# Patient Record
Sex: FEMALE | RURAL
[De-identification: names, ages, dates, MRNs, and addresses within clinical notes are randomized per-mention and may not be internally consistent; named-entity substitution may affect disease eponyms.]

---

## 2020-09-01 ENCOUNTER — APPOINTMENT (RX ONLY)
Dept: RURAL CLINIC 4 | Facility: CLINIC | Age: 80
Setting detail: DERMATOLOGY
End: 2020-09-01

## 2020-09-01 DIAGNOSIS — D22 MELANOCYTIC NEVI: ICD-10-CM

## 2020-09-01 DIAGNOSIS — L82.1 OTHER SEBORRHEIC KERATOSIS: ICD-10-CM

## 2020-09-01 DIAGNOSIS — L57.0 ACTINIC KERATOSIS: ICD-10-CM

## 2020-09-01 PROBLEM — D48.5 NEOPLASM OF UNCERTAIN BEHAVIOR OF SKIN: Status: ACTIVE | Noted: 2020-09-01

## 2020-09-01 PROCEDURE — ? COUNSELING

## 2020-09-01 PROCEDURE — 99202 OFFICE O/P NEW SF 15 MIN: CPT

## 2020-09-01 PROCEDURE — ? TREATMENT REGIMEN

## 2020-09-01 PROCEDURE — ? OBSERVATION

## 2020-09-01 ASSESSMENT — LOCATION ZONE DERM
LOCATION ZONE: TRUNK
LOCATION ZONE: SCALP
LOCATION ZONE: NOSE
LOCATION ZONE: FACE

## 2020-09-01 ASSESSMENT — LOCATION DETAILED DESCRIPTION DERM
LOCATION DETAILED: RIGHT CENTRAL MALAR CHEEK
LOCATION DETAILED: LEFT MID-UPPER BACK
LOCATION DETAILED: RIGHT MID-UPPER BACK
LOCATION DETAILED: NASAL DORSUM
LOCATION DETAILED: LEFT CENTRAL PARIETAL SCALP

## 2020-09-01 ASSESSMENT — LOCATION SIMPLE DESCRIPTION DERM
LOCATION SIMPLE: LEFT UPPER BACK
LOCATION SIMPLE: RIGHT CHEEK
LOCATION SIMPLE: SCALP
LOCATION SIMPLE: NOSE
LOCATION SIMPLE: RIGHT UPPER BACK

## 2020-09-01 NOTE — PROCEDURE: TREATMENT REGIMEN
Detail Level: Simple
Plan: Consider treating with 5% fluorouracil cream from home by applying nightly until irritation then stop and allow to heal.

## 2022-02-22 ENCOUNTER — OFFICE VISIT (OUTPATIENT)
Dept: URBAN - METROPOLITAN AREA CLINIC 46 | Facility: CLINIC | Age: 82
End: 2022-02-22
Payer: MEDICARE

## 2022-02-22 DIAGNOSIS — H35.3131 NONEXUDATIVE MACULAR DEGENERATION, EARLY DRY STAGE, BILATERAL: ICD-10-CM

## 2022-02-22 DIAGNOSIS — H40.1122 PRIMARY OPEN-ANGLE GLAUCOMA, MODERATE STAGE, LEFT EYE: Primary | ICD-10-CM

## 2022-02-22 DIAGNOSIS — H35.3111 NONEXUDATIVE AGE-RELATED MACULAR DEGENERATION, RIGHT EYE, EARLY DRY STAGE: ICD-10-CM

## 2022-02-22 PROCEDURE — 92134 CPTRZ OPH DX IMG PST SGM RTA: CPT | Performed by: OPHTHALMOLOGY

## 2022-02-22 PROCEDURE — 99213 OFFICE O/P EST LOW 20 MIN: CPT | Performed by: OPHTHALMOLOGY

## 2022-02-22 RX ORDER — BRIMONIDINE TARTRATE 2 MG/ML
0.2 % SOLUTION/ DROPS OPHTHALMIC
Refills: 0 | Status: ACTIVE
Start: 2022-02-22

## 2022-02-22 ASSESSMENT — INTRAOCULAR PRESSURE
OS: 25
OS: 22
OD: 22
OD: 18

## 2022-02-22 NOTE — IMPRESSION/PLAN
Impression: Nonexudative macular degeneration, early dry stage, bilateral: H35.4654. Plan: Patient advised that their macular degeneration appears stable. They are advised to continue AREDS/AREDS2 and the Amsler grid. We will continue to monitor periodically; call if any changes noted.

## 2022-02-22 NOTE — IMPRESSION/PLAN
Impression: Primary open-angle glaucoma, moderate stage, left eye: H40.1122. Plan: Intraocular pressure is not at acceptable limits. Discussed options.  They wish to proceed with SLT OS.

- Continue as previously prescribed Brimonidine 0.2% : Instill one drop in both eyes three times daily 

- Continue as previously prescribed Dorzolamide 22.3 mg-timolol 6.8 mg/mL eye drops : Instill one drop in both eyes twice daily 

- Continue as previously prescribed Lumigan 0.01 % eye Drops : Instill one drop in both eyes at bedtime

## 2022-04-20 ENCOUNTER — TESTING ONLY (OUTPATIENT)
Dept: URBAN - METROPOLITAN AREA CLINIC 46 | Facility: CLINIC | Age: 82
End: 2022-04-20
Payer: MEDICARE

## 2022-04-20 DIAGNOSIS — H40.1122 PRIMARY OPEN-ANGLE GLAUCOMA, LEFT EYE, MODERATE STAGE: Primary | ICD-10-CM

## 2022-04-20 PROCEDURE — 92133 CPTRZD OPH DX IMG PST SGM ON: CPT | Performed by: OPHTHALMOLOGY

## 2022-04-20 PROCEDURE — 92083 EXTENDED VISUAL FIELD XM: CPT | Performed by: OPHTHALMOLOGY

## 2022-04-22 ENCOUNTER — OFFICE VISIT (OUTPATIENT)
Dept: URBAN - METROPOLITAN AREA CLINIC 46 | Facility: CLINIC | Age: 82
End: 2022-04-22
Payer: MEDICARE

## 2022-04-22 DIAGNOSIS — H40.1132 PRIMARY OPEN-ANGLE GLAUCOMA, BILATERAL, MODERATE STAGE: Primary | ICD-10-CM

## 2022-04-22 PROCEDURE — 99213 OFFICE O/P EST LOW 20 MIN: CPT | Performed by: OPHTHALMOLOGY

## 2022-04-22 ASSESSMENT — INTRAOCULAR PRESSURE
OS: 24
OS: 20
OD: 16
OD: 20
OD: 15
OS: 22

## 2022-04-22 NOTE — IMPRESSION/PLAN
Impression: Primary open-angle glaucoma, bilateral, moderate stage: B38.8052. Plan: 
- Continue as previously prescribed Brimonidine 0.2% : Instill one drop in both eyes three times daily 

- Continue as previously prescribed Dorzolamide 22.3 mg-timolol 6.8 mg/mL eye drops : Instill one drop in both eyes twice daily 

- Continue as previously prescribed Lumigan 0.01 % eye Drops : Instill one drop in both eyes at bedtime

-Start Rhopressa at bedtime in left eye only, sample was given.

## 2022-05-19 ENCOUNTER — OFFICE VISIT (OUTPATIENT)
Dept: URBAN - METROPOLITAN AREA CLINIC 46 | Facility: CLINIC | Age: 82
End: 2022-05-19
Payer: MEDICARE

## 2022-05-19 DIAGNOSIS — H40.1123 PRIMARY OPEN-ANGLE GLAUCOMA, SEVERE STAGE, LEFT EYE: ICD-10-CM

## 2022-05-19 DIAGNOSIS — H40.1112 PRIMARY OPEN-ANGLE GLAUCOMA, MODERATE STAGE, RIGHT EYE: Primary | ICD-10-CM

## 2022-05-19 PROCEDURE — 99213 OFFICE O/P EST LOW 20 MIN: CPT | Performed by: OPHTHALMOLOGY

## 2022-05-19 ASSESSMENT — INTRAOCULAR PRESSURE
OS: 24
OD: 16
OD: 20
OS: 22

## 2022-05-19 NOTE — IMPRESSION/PLAN
Impression: Primary open-angle glaucoma, moderate stage, right eye: H40.1112. Plan: - Continue as previously prescribed Brimonidine 0.2% : Instill one drop in both eyes three times daily 

- Continue as previously prescribed Dorzolamide 22.3 mg-timolol 6.8 mg/mL eye drops : Instill one drop in both eyes twice daily 

- Continue as previously prescribed Lumigan 0.01 % eye Drops : Instill one drop in both eyes at bedtime

-Change Rhopressa at bedtime into both eyes, sample was given. It appears that there is a 3 mm pressure decrease in the left eye relative to the right with Rhopressa; continue this for now in the left and start it in the right. Clearly she needs a lower pressure. Non-invasive options would include TSCP. I believe she will likely require trabeculectomy or tube shunt.

## 2022-05-19 NOTE — IMPRESSION/PLAN
Impression: Primary open-angle glaucoma, severe stage, left eye: H40.2666. Plan: - Continue as previously prescribed Brimonidine 0.2% : Instill one drop in both eyes three times daily 

- Continue as previously prescribed Dorzolamide 22.3 mg-timolol 6.8 mg/mL eye drops : Instill one drop in both eyes twice daily 

- Continue as previously prescribed Lumigan 0.01 % eye Drops : Instill one drop in both eyes at bedtime

-Change Rhopressa at bedtime into both eyes, sample was given. It appears that there is a 3 mm pressure decrease in the left eye relative to the right with Rhopressa; continue this for now in the left and start it in the right. Clearly she needs a lower pressure. Non-invasive options would include TSCP. I believe she will likely require trabeculectomy or tube shunt.

## 2023-01-05 ENCOUNTER — OFFICE VISIT (OUTPATIENT)
Dept: URBAN - METROPOLITAN AREA CLINIC 46 | Facility: CLINIC | Age: 83
End: 2023-01-05
Payer: MEDICARE

## 2023-01-05 DIAGNOSIS — H35.3131 NONEXUDATIVE MACULAR DEGENERATION, EARLY DRY STAGE, BILATERAL: ICD-10-CM

## 2023-01-05 DIAGNOSIS — H40.1132 PRIMARY OPEN-ANGLE GLAUCOMA, MODERATE STAGE, BILATERAL: Primary | ICD-10-CM

## 2023-01-05 PROCEDURE — 99213 OFFICE O/P EST LOW 20 MIN: CPT | Performed by: OPHTHALMOLOGY

## 2023-01-05 RX ORDER — ACETAZOLAMIDE 500 MG/1
500 MG CAPSULE, EXTENDED RELEASE ORAL
Qty: 60 | Refills: 2 | Status: ACTIVE
Start: 2023-01-05

## 2023-01-05 ASSESSMENT — INTRAOCULAR PRESSURE
OS: 23
OD: 13
OS: 21
OD: 19

## 2023-01-05 NOTE — IMPRESSION/PLAN
Impression: Primary open-angle glaucoma, moderate stage, bilateral: H40.1132. Plan:  Pressure is not at acceptable range. Recommend SLT OS. Start Diamox BID PO. Continue:
 Rocklatan at bedtime in both eyes. Dorzolamide/Timolol three times daily in both eyes. Brimonidine three times daily in both eyes. Clinical examination and testing indicates risk for loss of vision due to glaucoma.

## 2023-01-05 NOTE — IMPRESSION/PLAN
Impression: Nonexudative macular degeneration, early dry stage, bilateral: H35.4516. Plan: Patient advised that their macular degeneration appears stable. They are advised to continue AREDS/AREDS2 and the Amsler grid. We will continue to monitor periodically; call if any changes noted.

## 2023-01-16 ENCOUNTER — LASER (OUTPATIENT)
Dept: URBAN - METROPOLITAN AREA SURGERY 25 | Facility: SURGERY | Age: 83
End: 2023-01-16
Payer: MEDICARE

## 2023-01-16 PROCEDURE — 65855 TRABECULOPLASTY LASER SURG: CPT | Performed by: OPHTHALMOLOGY

## 2023-02-17 ENCOUNTER — OFFICE VISIT (OUTPATIENT)
Dept: URBAN - METROPOLITAN AREA CLINIC 46 | Facility: CLINIC | Age: 83
End: 2023-02-17
Payer: MEDICARE

## 2023-02-17 DIAGNOSIS — H40.1132 PRIMARY OPEN-ANGLE GLAUCOMA, MODERATE STAGE, BILATERAL: Primary | ICD-10-CM

## 2023-02-17 DIAGNOSIS — H35.3131 NONEXUDATIVE MACULAR DEGENERATION, EARLY DRY STAGE, BILATERAL: ICD-10-CM

## 2023-02-17 DIAGNOSIS — H02.412 MECHANICAL PTOSIS OF LEFT EYELID: ICD-10-CM

## 2023-02-17 PROCEDURE — 99213 OFFICE O/P EST LOW 20 MIN: CPT | Performed by: OPHTHALMOLOGY

## 2023-02-17 ASSESSMENT — INTRAOCULAR PRESSURE
OS: 23
OD: 14
OD: 16
OS: 20

## 2023-02-17 NOTE — IMPRESSION/PLAN
Impression: Primary open-angle glaucoma, moderate stage, bilateral: H40.1132. Plan: Pressure is not at acceptable range. Recommend consult with Dr. Andreina Suarez Continue  Diamox BID PO. Continue:
 Rocklatan at bedtime in both eyes. Dorzolamide/Timolol three times daily in both eyes. Brimonidine three times daily in both eyes. Clinical examination and testing indicates risk for loss of vision due to glaucoma.

## 2023-02-17 NOTE — IMPRESSION/PLAN
Impression: Nonexudative macular degeneration, early dry stage, bilateral: H35.4914. Plan: Patient advised that their macular degeneration appears stable. They are advised to continue AREDS/AREDS2 and the Amsler grid. We will continue to monitor periodically; call if any changes noted.